# Patient Record
Sex: MALE | Race: WHITE
[De-identification: names, ages, dates, MRNs, and addresses within clinical notes are randomized per-mention and may not be internally consistent; named-entity substitution may affect disease eponyms.]

---

## 2020-01-23 ENCOUNTER — HOSPITAL ENCOUNTER (EMERGENCY)
Dept: HOSPITAL 38 - CC.ED | Age: 25
Discharge: HOME | End: 2020-01-23
Payer: COMMERCIAL

## 2020-01-23 DIAGNOSIS — T15.11XA: Primary | ICD-10-CM
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
## 2020-01-23 NOTE — EDM.PDOC
ED HPI GENERAL MEDICAL PROBLEM





- General


Chief Complaint: Eye Problems


Stated Complaint: FB Right eye


Time Seen by Provider: 01/23/20 18:10


Source of Information: Reports: Patient


History Limitations: Reports: No Limitations





- History of Present Illness


INITIAL COMMENTS - FREE TEXT/NARRATIVE: 





Was grinding today and had a piece of metal fly into the right eye.  He was 

wearing safety glasses.  He feels that something is in his eye under the top 

eyelid.  He has been rubbing it.  He did this shortly before noon.


Onset: Today


Location: Reports: Other (right eye)


  ** Right Eye


Pain Score (Numeric/FACES): 4





- Related Data


 Allergies











Allergy/AdvReac Type Severity Reaction Status Date / Time


 


No Known Allergies Allergy   Verified 01/23/20 18:13











Home Meds: 


 Home Meds





. [No Known Home Meds]  01/23/20 [History]











Past Medical History





- Past Surgical History


HEENT Surgical History: Reports: Tonsillectomy





Social & Family History





- Family History


Family Medical History: Noncontributory





- Tobacco Use


Smoking Status *Q: Never Smoker





- Recreational Drug Use


Recreational Drug Use: No





ED ROS GENERAL





- Review of Systems


Review Of Systems: See Below


Constitutional: Denies: Fever


HEENT: Reports: Eye Pain (right eye)


Respiratory: Reports: No Symptoms





ED EXAM GENERAL W FULL EYE





- Physical Exam


Exam: See Below


Exam Limited By: No Limitations


General Appearance: Alert, WD/WN, Mild Distress


Eye Exam: Right Eye: Foreign Body, Bilateral Eye: PERRL


Eyelids: Bilateral: Normal Appearance


Cornea Exam: Right: Corneal Abrasion


Pupillary Size: Bilateral: 4 mm


Pupillary Reaction: Bilateral: Brisk


Neurological: Alert





ED EYE w/ Add Procedure





- Eye Procedure


Alcaine Drops Administered: Yes (and fluroscein.)


Eye FB Removal: Removal w/ Cotton Swab (Small dark particle removed from under 

the right upper eyelid.  )





Course





- Vital Signs


Last Recorded V/S: 


 Last Vital Signs











Temp  98.6 F   01/23/20 18:13


 


Pulse  85   01/23/20 18:13


 


Resp  18   01/23/20 18:13


 


BP  148/78 H  01/23/20 18:13


 


Pulse Ox  98   01/23/20 18:13














Departure





- Departure


Time of Disposition: 18:27


Disposition: Home, Self-Care 01


Condition: Good


Clinical Impression: 


Corneal abrasion


Qualifiers:


 Encounter type: initial encounter Laterality: right Qualified Code(s): 

S05.01XA - Injury of conjunctiva and corneal abrasion without foreign body, 

right eye, initial encounter





Foreign body in eye


Qualifiers:


 Encounter type: initial encounter Laterality: right Qualified Code(s): 

T15.91XA - Foreign body on external eye, part unspecified, right eye, initial 

encounter








- Discharge Information


*PRESCRIPTION DRUG MONITORING PROGRAM REVIEWED*: Not Applicable


*COPY OF PRESCRIPTION DRUG MONITORING REPORT IN PATIENT HEATHER: Not Applicable


Instructions:  Eye Foreign Body


Forms:  ED Department Discharge


Additional Instructions: 


If You still have sensation that something is in the eye you need to see Eye 

Dr. tomorrow.  Do not wait as you need to be seen tomorrow.  If pain is gone 

you do not need to see them.


Do not rub eye


Recheck if any ne concerns.





Sepsis Event Note





- Evaluation


Sepsis Screening Result: No Definite Risk





- Focused Exam


Vital Signs: 


 Vital Signs











  Temp Pulse Resp BP Pulse Ox


 


 01/23/20 18:13  98.6 F  85  18  148/78 H  98











Date Exam was Performed: 01/23/20


Time Exam was Performed: 18:35





- Problem List & Annotations


(1) Corneal abrasion


SNOMED Code(s): 12490704


   Code(s): S05.00XA - INJ CONJUNCTIVA AND CORNEAL ABRASION W/O FB, UNSP EYE, 

INIT   Status: Acute   Priority: High   


Qualifiers: 


   Encounter type: initial encounter   Laterality: right   Qualified Code(s): 

S05.01XA - Injury of conjunctiva and corneal abrasion without foreign body, 

right eye, initial encounter   





(2) Foreign body in eye


SNOMED Code(s): 102605132, 089406857


   Code(s): T15.90XA - FOREIGN BODY ON EXTERNAL EYE, PART UNSP, UNSP EYE, INIT 

  Status: Acute   Priority: High   


Qualifiers: 


   Encounter type: initial encounter   Laterality: right   Qualified Code(s): 

T15.91XA - Foreign body on external eye, part unspecified, right eye, initial 

encounter   





- Problem List Review


Problem List Initiated/Reviewed/Updated: Yes